# Patient Record
Sex: FEMALE | Race: ASIAN | NOT HISPANIC OR LATINO | Employment: PART TIME | URBAN - METROPOLITAN AREA
[De-identification: names, ages, dates, MRNs, and addresses within clinical notes are randomized per-mention and may not be internally consistent; named-entity substitution may affect disease eponyms.]

---

## 2024-11-06 ENCOUNTER — TRANSCRIBE ORDERS (OUTPATIENT)
Dept: ADMINISTRATIVE | Facility: HOSPITAL | Age: 44
End: 2024-11-06
Payer: OTHER GOVERNMENT

## 2024-11-06 DIAGNOSIS — R92.8 ABNORMALITY OF RIGHT BREAST ON SCREENING MAMMOGRAPHY: Primary | ICD-10-CM

## 2024-11-12 ENCOUNTER — OFFICE VISIT (OUTPATIENT)
Dept: FAMILY MEDICINE CLINIC | Facility: CLINIC | Age: 44
End: 2024-11-12
Payer: OTHER GOVERNMENT

## 2024-11-12 VITALS
RESPIRATION RATE: 18 BRPM | WEIGHT: 140 LBS | BODY MASS INDEX: 27.48 KG/M2 | DIASTOLIC BLOOD PRESSURE: 93 MMHG | HEIGHT: 60 IN | SYSTOLIC BLOOD PRESSURE: 165 MMHG | TEMPERATURE: 98.8 F | HEART RATE: 80 BPM | OXYGEN SATURATION: 100 %

## 2024-11-12 DIAGNOSIS — Z76.89 ENCOUNTER TO ESTABLISH CARE: ICD-10-CM

## 2024-11-12 DIAGNOSIS — R05.3 PERSISTENT COUGH FOR 3 WEEKS OR LONGER: ICD-10-CM

## 2024-11-12 DIAGNOSIS — R03.0 ELEVATED BLOOD PRESSURE READING IN OFFICE WITHOUT DIAGNOSIS OF HYPERTENSION: ICD-10-CM

## 2024-11-12 DIAGNOSIS — J45.41 MODERATE PERSISTENT ASTHMA WITH ACUTE EXACERBATION: Primary | ICD-10-CM

## 2024-11-12 DIAGNOSIS — R09.82 PND (POST-NASAL DRIP): ICD-10-CM

## 2024-11-12 PROCEDURE — 99204 OFFICE O/P NEW MOD 45 MIN: CPT | Performed by: REGISTERED NURSE

## 2024-11-12 RX ORDER — PREDNISONE 10 MG/1
TABLET ORAL SEE ADMIN INSTRUCTIONS
COMMUNITY
Start: 2024-11-06

## 2024-11-12 RX ORDER — MONTELUKAST SODIUM 4 MG/1
4 TABLET, CHEWABLE ORAL NIGHTLY
Qty: 90 TABLET | Refills: 0 | Status: SHIPPED | OUTPATIENT
Start: 2024-11-12

## 2024-11-12 RX ORDER — ALBUTEROL SULFATE 2.5 MG/3ML
SOLUTION RESPIRATORY (INHALATION)
COMMUNITY
Start: 2024-10-14

## 2024-11-12 RX ORDER — ALBUTEROL SULFATE 90 UG/1
POWDER, METERED RESPIRATORY (INHALATION)
COMMUNITY
Start: 2024-11-06

## 2024-11-12 RX ORDER — ALBUTEROL SULFATE 90 UG/1
INHALANT RESPIRATORY (INHALATION)
COMMUNITY

## 2024-11-12 NOTE — PROGRESS NOTES
Subjective        Stacy Eastman is a 43 y.o. female.     Chief Complaint   Patient presents with    Establish Care       History of Present Illness  The patient is a 43-year-old female who presents for evaluation of asthma.    She has been managing her asthma with an albuterol inhaler, using it as needed for wheezing. Recently, she has been using it more frequently, up to three times daily. Additionally, she uses a nebulizer once daily due to recent asthma attacks. Her asthma symptoms have been worsening over the past month, possibly due to a change in climate during a vacation. She is currently on the last day of a prednisone pack, which was prescribed for a cough that started on 10/10/2024. She has been using ProAir RespiClick once daily. She was diagnosed with asthma in 2014 but had not experienced an attack for a while until recently. She has not taken Singulair before. She had a pulmonary function test in 2014 or 2015 but has never had a TB skin test.    She has also been experiencing high blood pressure.    She reports chest pain when coughing and produces yellow-green phlegm. She occasionally has an itchy throat. She has been taking NyQuil at night.    She does not use any diabetic monitoring devices and reports no bladder or bowel issues. She is unsure if she has received the BCG vaccine. She reports no possibility of being pregnant.    SOCIAL HISTORY  She is from [facility].     The following portions of the patient's history were reviewed and updated as appropriate: allergies, current medications, past family history, past medical history, past social history, past surgical history and problem list.    Review of Systems     Current Outpatient Medications:     albuterol (PROVENTIL/VENTOLIN) nebulization syringe, , Disp: , Rfl:     albuterol sulfate  (90 Base) MCG/ACT inhaler, , Disp: , Rfl:     predniSONE (DELTASONE) 10 MG (21) dose pack, See Admin Instructions. follow package directions, Disp: ,  "Rfl:     ProAir RespiClick 108 (90 Base) MCG/ACT inhaler, INHALE 1 PUFF VIA INHALATION AS DIRECTED EVERY 4 HOURS, Disp: , Rfl:     Albuterol-Budesonide 90-80 MCG/ACT aerosol, Inhale 1 puff Every 4 (Four) Hours As Needed (wheeze or SOA)., Disp: 10.7 g, Rfl: 1    montelukast (Singulair) 4 MG chewable tablet, Chew 1 tablet Every Night., Disp: 90 tablet, Rfl: 0      Objective     /93 (BP Location: Left arm, Patient Position: Sitting, Cuff Size: Adult)   Pulse 80   Temp 98.8 °F (37.1 °C) (Oral)   Resp 18   Ht 152.4 cm (60\")   Wt 63.5 kg (140 lb)   SpO2 100%   BMI 27.34 kg/m²     Physical Exam  Vitals reviewed.   Constitutional:       Appearance: Normal appearance. She is normal weight.   HENT:      Right Ear: Ear canal and external ear normal. There is no impacted cerumen.      Left Ear: Ear canal and external ear normal. There is no impacted cerumen.      Ears:      Comments: Mild GALDINO bilaterally.     Nose: Nose normal.      Mouth/Throat:      Mouth: Mucous membranes are moist.      Pharynx: Oropharyngeal exudate and posterior oropharyngeal erythema present.      Comments: Mild redness and small amount of thick light green/white mucus at back of throat.  Eyes:      Conjunctiva/sclera: Conjunctivae normal.   Cardiovascular:      Rate and Rhythm: Normal rate and regular rhythm.      Pulses: Normal pulses.      Heart sounds: Normal heart sounds.   Pulmonary:      Effort: Pulmonary effort is normal.      Breath sounds: Normal breath sounds. No wheezing, rhonchi or rales.   Abdominal:      General: Bowel sounds are normal.      Palpations: Abdomen is soft.      Tenderness: There is no abdominal tenderness.   Musculoskeletal:      Right lower leg: No edema.      Left lower leg: No edema.   Skin:     General: Skin is warm and dry.      Capillary Refill: Capillary refill takes less than 2 seconds.   Neurological:      General: No focal deficit present.      Mental Status: She is alert and oriented to person, place, " and time.   Psychiatric:         Mood and Affect: Mood normal.         Behavior: Behavior normal.         Thought Content: Thought content normal.         Judgment: Judgment normal.         Physical Exam  Throat appears normal. Ears are clear. Sinuses are clear. Nose is clear. Minimal drainage noted down the throat.  No wheezing detected in lungs.    Vital Signs  Blood pressure in the left arm is 140/88.       Result Review :   The following data was reviewed by: ISAK Pinto on 11/12/2024:  No results found for this or any previous visit (from the past 8 weeks).             Results         Assessment & Plan    Diagnoses and all orders for this visit:    1. Moderate persistent asthma with acute exacerbation (Primary)  -     montelukast (Singulair) 4 MG chewable tablet; Chew 1 tablet Every Night.  Dispense: 90 tablet; Refill: 0  -     Complete PFT - Pre & Post Bronchodilator; Future  -     Cancel: TB Skin Test  -     XR Chest PA & Lateral; Future  -     Albuterol-Budesonide 90-80 MCG/ACT aerosol; Inhale 1 puff Every 4 (Four) Hours As Needed (wheeze or SOA).  Dispense: 10.7 g; Refill: 1    2. Elevated blood pressure reading in office without diagnosis of hypertension    3. Encounter to establish care    4. Persistent cough for 3 weeks or longer  -     Cancel: TB Skin Test  -     XR Chest PA & Lateral; Future  -     Albuterol-Budesonide 90-80 MCG/ACT aerosol; Inhale 1 puff Every 4 (Four) Hours As Needed (wheeze or SOA).  Dispense: 10.7 g; Refill: 1    5. PND (post-nasal drip)      I will order TB skin test after CXR is complete and reviewed.    There are no Patient Instructions on file for this visit.    Assessment & Plan  1. Asthma.  The patient reports increased asthma symptoms over the past month, likely exacerbated by a recent vacation and change in climate. She has been using her albuterol inhaler three times a day and her nebulizer once a day. A chest x-ray and TB skin test will be conducted. Pulmonary  function testing will be scheduled. A prescription for Singulair (montelukast) to be taken once daily at night has been provided. Mucinex liquid is recommended for cough and drainage. The use of Airsupra inhaler is advised, while the other two inhalers should be discontinued. She is currently finishing a prednisone pack for her cough.    2. Hypertension.  The patient reports experiencing high blood pressure, which may be influenced by her use of steroids and albuterol inhalers. Blood pressure reading today was 140/88 mmHg. Monitoring and management of blood pressure will continue.    3. Cough.  The patient has been experiencing a cough for about a week, with yellow-green phlegm. She is advised to take Mucinex liquid and avoid NyQuil. The cough may be related to drainage causing irritation.    4. Health Maintenance.  The patient inquired about receiving flu and COVID-19 vaccinations. She is advised to wait a week after finishing her steroid course to ensure proper immune response to the vaccinations.    Follow-up  Return in 1 month for follow-up.       Follow Up   Return in about 1 month (around 12/12/2024) for Recheck.    Patient was given instructions and counseling regarding her condition or for health maintenance advice. Please see specific information pulled into the AVS if appropriate.      ISAK Pinto     11/12/24    Patient or patient representative verbalized consent for the use of Ambient Listening during the visit with  ISAK Pinto for chart documentation. 11/12/2024  09:31 EST

## 2024-11-29 ENCOUNTER — HOSPITAL ENCOUNTER (OUTPATIENT)
Dept: RESPIRATORY THERAPY | Facility: HOSPITAL | Age: 44
Discharge: HOME | End: 2024-11-29
Payer: OTHER GOVERNMENT

## 2024-11-29 VITALS — OXYGEN SATURATION: 96 % | HEART RATE: 82 BPM | RESPIRATION RATE: 16 BRPM

## 2024-11-29 DIAGNOSIS — J45.41 MODERATE PERSISTENT ASTHMA WITH ACUTE EXACERBATION: ICD-10-CM

## 2024-11-29 PROCEDURE — 94726 PLETHYSMOGRAPHY LUNG VOLUMES: CPT

## 2024-11-29 PROCEDURE — 94060 EVALUATION OF WHEEZING: CPT

## 2024-11-29 PROCEDURE — 94729 DIFFUSING CAPACITY: CPT

## 2024-11-29 PROCEDURE — 94664 DEMO&/EVAL PT USE INHALER: CPT

## 2024-11-29 PROCEDURE — 94799 UNLISTED PULMONARY SVC/PX: CPT

## 2024-11-29 RX ORDER — ALBUTEROL SULFATE 90 UG/1
2 INHALANT RESPIRATORY (INHALATION)
Status: DISCONTINUED | OUTPATIENT
Start: 2024-11-29 | End: 2024-11-29

## 2024-11-29 RX ORDER — ALBUTEROL SULFATE 90 UG/1
2 INHALANT RESPIRATORY (INHALATION) ONCE
Status: COMPLETED | OUTPATIENT
Start: 2024-11-29 | End: 2024-11-29

## 2024-11-29 RX ADMIN — ALBUTEROL SULFATE 2 PUFF: 108 AEROSOL, METERED RESPIRATORY (INHALATION) at 07:11

## 2024-12-02 ENCOUNTER — TELEPHONE (OUTPATIENT)
Dept: FAMILY MEDICINE CLINIC | Facility: CLINIC | Age: 44
End: 2024-12-02
Payer: OTHER GOVERNMENT

## 2024-12-02 NOTE — TELEPHONE ENCOUNTER
Called patient to follow up on the XR Chest that Luma ordered. She said she will call the hospital to schedule her appointment.

## 2024-12-09 ENCOUNTER — OFFICE VISIT (OUTPATIENT)
Dept: FAMILY MEDICINE CLINIC | Facility: CLINIC | Age: 44
End: 2024-12-09
Payer: OTHER GOVERNMENT

## 2024-12-09 VITALS
HEIGHT: 60 IN | HEART RATE: 92 BPM | DIASTOLIC BLOOD PRESSURE: 70 MMHG | WEIGHT: 145 LBS | SYSTOLIC BLOOD PRESSURE: 130 MMHG | OXYGEN SATURATION: 99 % | TEMPERATURE: 98.2 F | RESPIRATION RATE: 15 BRPM | BODY MASS INDEX: 28.47 KG/M2

## 2024-12-09 DIAGNOSIS — Z23 ENCOUNTER FOR ADMINISTRATION OF COVID-19 VACCINE: ICD-10-CM

## 2024-12-09 DIAGNOSIS — Z23 INFLUENZA VACCINE NEEDED: ICD-10-CM

## 2024-12-09 DIAGNOSIS — J45.41 MODERATE PERSISTENT ASTHMA WITH ACUTE EXACERBATION: Primary | ICD-10-CM

## 2024-12-09 DIAGNOSIS — R03.0 ELEVATED BLOOD PRESSURE READING IN OFFICE WITHOUT DIAGNOSIS OF HYPERTENSION: ICD-10-CM

## 2024-12-09 DIAGNOSIS — Z13.1 SCREENING FOR DIABETES MELLITUS (DM): ICD-10-CM

## 2024-12-09 PROCEDURE — 91320 SARSCV2 VAC 30MCG TRS-SUC IM: CPT | Performed by: REGISTERED NURSE

## 2024-12-09 PROCEDURE — 90656 IIV3 VACC NO PRSV 0.5 ML IM: CPT | Performed by: REGISTERED NURSE

## 2024-12-09 PROCEDURE — 90480 ADMN SARSCOV2 VAC 1/ONLY CMP: CPT | Performed by: REGISTERED NURSE

## 2024-12-09 PROCEDURE — 90471 IMMUNIZATION ADMIN: CPT | Performed by: REGISTERED NURSE

## 2024-12-09 PROCEDURE — 99214 OFFICE O/P EST MOD 30 MIN: CPT | Performed by: REGISTERED NURSE

## 2024-12-09 NOTE — PROGRESS NOTES
"Subjective        Stacy Eastman is a 44 y.o. female who presents to [facility].     Chief Complaint   Patient presents with    Asthma     1 month f/u       Asthma  Her past medical history is significant for asthma.         F/U R/T asthma symptoms.    Asthma - Pt says her breathing is better. Uses AirSupra MDI twice a week.  She says she is also breathing better.  Elevated BP at last visit - B/P is normal today. Pt was finishing steroid tx before.    The following portions of the patient's history were reviewed and updated as appropriate: allergies, current medications, past family history, past medical history, past social history, past surgical history and problem list.    No Known Allergies       Current Outpatient Medications:     albuterol (PROVENTIL/VENTOLIN) nebulization syringe, , Disp: , Rfl:     Albuterol-Budesonide 90-80 MCG/ACT aerosol, Inhale 1 puff Every 4 (Four) Hours As Needed (wheeze or SOA)., Disp: 10.7 g, Rfl: 1    montelukast (Singulair) 4 MG chewable tablet, Chew 1 tablet Every Night., Disp: 90 tablet, Rfl: 0    Review of Systems     Objective     /70 (BP Location: Left arm, Patient Position: Sitting, Cuff Size: Adult)   Pulse 92   Temp 98.2 °F (36.8 °C) (Oral)   Resp 15   Ht 152.4 cm (60\")   Wt 65.8 kg (145 lb)   SpO2 99%   BMI 28.32 kg/m²   BMI is >= 25 and <30. (Overweight) The following options were offered after discussion;: information on healthy weight added to patient's after visit summary      Physical Exam  Vitals reviewed.   Constitutional:       Appearance: Normal appearance. She is normal weight.   HENT:      Right Ear: Tympanic membrane, ear canal and external ear normal. There is no impacted cerumen.      Left Ear: Tympanic membrane, ear canal and external ear normal. There is no impacted cerumen.      Nose: Nose normal. No congestion or rhinorrhea.      Mouth/Throat:      Mouth: Mucous membranes are moist.      Pharynx: Posterior " oropharyngeal erythema present. No oropharyngeal exudate.      Tonsils: No tonsillar exudate. 2+ on the right. 2+ on the left.      Comments: Mild erythema, chronically enlarged tonsils, mild cobblestoning.  Eyes:      Conjunctiva/sclera: Conjunctivae normal.   Cardiovascular:      Rate and Rhythm: Normal rate and regular rhythm.      Pulses: Normal pulses.      Heart sounds: Normal heart sounds.   Pulmonary:      Effort: Pulmonary effort is normal. No respiratory distress.      Breath sounds: Normal breath sounds. No stridor. No wheezing, rhonchi or rales.   Abdominal:      General: Abdomen is flat. Bowel sounds are normal.      Palpations: Abdomen is soft.      Tenderness: There is no abdominal tenderness.   Skin:     General: Skin is warm and dry.      Capillary Refill: Capillary refill takes less than 2 seconds.   Neurological:      General: No focal deficit present.      Mental Status: She is alert and oriented to person, place, and time.   Psychiatric:         Mood and Affect: Mood normal.         Behavior: Behavior normal.         Thought Content: Thought content normal.         Judgment: Judgment normal.                 Result Review    The following data was reviewed by: ISAK Pinto on 12/09/2024:               Assessment & Plan    Diagnoses and all orders for this visit:    1. Moderate persistent asthma with acute exacerbation (Primary)  -     Comprehensive Metabolic Panel; Future  -     CBC & Differential; Future    2. Elevated blood pressure reading in office without diagnosis of hypertension  -     TSH Rfx On Abnormal To Free T4; Future  -     Lipid Panel; Future  -     Comprehensive Metabolic Panel; Future  -     CBC & Differential; Future    3. Screening for diabetes mellitus (DM)  -     Hemoglobin A1c; Future    4. Influenza vaccine needed  -     Fluzone >6mos ([id number])    5. Encounter for administration of COVID-19 vaccine  -     COVID-19 (Pfizer) 12yrs+ (COMIRNATY)       There are no  Patient Instructions on file for this visit.      Follow Up   Return in about 6 months (around 6/9/2025) for Next scheduled follow up.    Patient was given instructions and counseling regarding her condition or for health maintenance advice. Please see specific information pulled into the AVS if appropriate.     Luma Hanson, APRN     12/09/24

## 2024-12-30 ENCOUNTER — TELEPHONE (OUTPATIENT)
Dept: FAMILY MEDICINE CLINIC | Facility: CLINIC | Age: 44
End: 2024-12-30
Payer: OTHER GOVERNMENT

## 2025-01-28 ENCOUNTER — LAB (OUTPATIENT)
Dept: LAB | Facility: HOSPITAL | Age: 45
End: 2025-01-28
Payer: OTHER GOVERNMENT

## 2025-01-28 DIAGNOSIS — J45.41 MODERATE PERSISTENT ASTHMA WITH ACUTE EXACERBATION: ICD-10-CM

## 2025-01-28 DIAGNOSIS — R03.0 ELEVATED BLOOD PRESSURE READING IN OFFICE WITHOUT DIAGNOSIS OF HYPERTENSION: ICD-10-CM

## 2025-01-28 DIAGNOSIS — D58.2 ABNORMAL HEMOGLOBIN (HGB): ICD-10-CM

## 2025-01-28 DIAGNOSIS — Z13.1 SCREENING FOR DIABETES MELLITUS (DM): ICD-10-CM

## 2025-01-28 LAB
ALBUMIN SERPL-MCNC: 4.1 G/DL (ref 3.5–5.2)
ALBUMIN/GLOB SERPL: 1.1 G/DL
ALP SERPL-CCNC: 82 U/L (ref 39–117)
ALT SERPL W P-5'-P-CCNC: 14 U/L (ref 1–33)
ANION GAP SERPL CALCULATED.3IONS-SCNC: 10.3 MMOL/L (ref 5–15)
AST SERPL-CCNC: 19 U/L (ref 1–32)
BASOPHILS # BLD AUTO: 0.06 10*3/MM3 (ref 0–0.2)
BASOPHILS NFR BLD AUTO: 0.9 % (ref 0–1.5)
BILIRUB SERPL-MCNC: 0.4 MG/DL (ref 0–1.2)
BUN SERPL-MCNC: 10 MG/DL (ref 6–20)
BUN/CREAT SERPL: 14.9 (ref 7–25)
CALCIUM SPEC-SCNC: 9 MG/DL (ref 8.6–10.5)
CHLORIDE SERPL-SCNC: 102 MMOL/L (ref 98–107)
CHOLEST SERPL-MCNC: 178 MG/DL (ref 0–200)
CO2 SERPL-SCNC: 23.7 MMOL/L (ref 22–29)
CREAT SERPL-MCNC: 0.67 MG/DL (ref 0.57–1)
DEPRECATED RDW RBC AUTO: 45.5 FL (ref 37–54)
EGFRCR SERPLBLD CKD-EPI 2021: 110.7 ML/MIN/1.73
EOSINOPHIL # BLD AUTO: 0.1 10*3/MM3 (ref 0–0.4)
EOSINOPHIL NFR BLD AUTO: 1.5 % (ref 0.3–6.2)
ERYTHROCYTE [DISTWIDTH] IN BLOOD BY AUTOMATED COUNT: 16 % (ref 12.3–15.4)
GLOBULIN UR ELPH-MCNC: 3.6 GM/DL
GLUCOSE SERPL-MCNC: 86 MG/DL (ref 65–99)
HBA1C MFR BLD: 5.8 % (ref 4.8–5.6)
HCT VFR BLD AUTO: 34.5 % (ref 34–46.6)
HDLC SERPL-MCNC: 60 MG/DL (ref 40–60)
HGB BLD-MCNC: 10.3 G/DL (ref 12–15.9)
IMM GRANULOCYTES # BLD AUTO: 0.03 10*3/MM3 (ref 0–0.05)
IMM GRANULOCYTES NFR BLD AUTO: 0.4 % (ref 0–0.5)
LDLC SERPL CALC-MCNC: 98 MG/DL (ref 0–100)
LDLC/HDLC SERPL: 1.59 {RATIO}
LYMPHOCYTES # BLD AUTO: 1.87 10*3/MM3 (ref 0.7–3.1)
LYMPHOCYTES NFR BLD AUTO: 27.2 % (ref 19.6–45.3)
MCH RBC QN AUTO: 23.3 PG (ref 26.6–33)
MCHC RBC AUTO-ENTMCNC: 29.9 G/DL (ref 31.5–35.7)
MCV RBC AUTO: 78.1 FL (ref 79–97)
MONOCYTES # BLD AUTO: 0.63 10*3/MM3 (ref 0.1–0.9)
MONOCYTES NFR BLD AUTO: 9.2 % (ref 5–12)
NEUTROPHILS NFR BLD AUTO: 4.18 10*3/MM3 (ref 1.7–7)
NEUTROPHILS NFR BLD AUTO: 60.8 % (ref 42.7–76)
NRBC BLD AUTO-RTO: 0 /100 WBC (ref 0–0.2)
PLATELET # BLD AUTO: 420 10*3/MM3 (ref 140–450)
PMV BLD AUTO: 10.6 FL (ref 6–12)
POTASSIUM SERPL-SCNC: 3.8 MMOL/L (ref 3.5–5.2)
PROT SERPL-MCNC: 7.7 G/DL (ref 6–8.5)
RBC # BLD AUTO: 4.42 10*6/MM3 (ref 3.77–5.28)
SODIUM SERPL-SCNC: 136 MMOL/L (ref 136–145)
TRIGL SERPL-MCNC: 112 MG/DL (ref 0–150)
TSH SERPL DL<=0.05 MIU/L-ACNC: 0.83 UIU/ML (ref 0.27–4.2)
VLDLC SERPL-MCNC: 20 MG/DL (ref 5–40)
WBC NRBC COR # BLD AUTO: 6.87 10*3/MM3 (ref 3.4–10.8)

## 2025-01-28 PROCEDURE — 36415 COLL VENOUS BLD VENIPUNCTURE: CPT

## 2025-01-28 PROCEDURE — 80061 LIPID PANEL: CPT

## 2025-01-28 PROCEDURE — 85025 COMPLETE CBC W/AUTO DIFF WBC: CPT

## 2025-01-28 PROCEDURE — 84443 ASSAY THYROID STIM HORMONE: CPT

## 2025-01-28 PROCEDURE — 82728 ASSAY OF FERRITIN: CPT

## 2025-01-28 PROCEDURE — 84466 ASSAY OF TRANSFERRIN: CPT

## 2025-01-28 PROCEDURE — 83615 LACTATE (LD) (LDH) ENZYME: CPT

## 2025-01-28 PROCEDURE — 83036 HEMOGLOBIN GLYCOSYLATED A1C: CPT

## 2025-01-28 PROCEDURE — 83540 ASSAY OF IRON: CPT

## 2025-01-28 PROCEDURE — 80053 COMPREHEN METABOLIC PANEL: CPT

## 2025-01-31 DIAGNOSIS — D58.2 ABNORMAL HEMOGLOBIN (HGB): Primary | ICD-10-CM

## 2025-01-31 LAB
FERRITIN SERPL-MCNC: 19.8 NG/ML (ref 13–150)
IRON 24H UR-MRATE: 61 MCG/DL (ref 37–145)
IRON SATN MFR SERPL: 11 % (ref 20–50)
LDH SERPL-CCNC: 187 U/L (ref 135–214)
TIBC SERPL-MCNC: 551 MCG/DL (ref 298–536)
TRANSFERRIN SERPL-MCNC: 370 MG/DL (ref 200–360)
TRANSFERRIN SERPL-MCNC: 370 MG/DL (ref 200–360)

## 2025-02-03 ENCOUNTER — TELEPHONE (OUTPATIENT)
Dept: FAMILY MEDICINE CLINIC | Facility: CLINIC | Age: 45
End: 2025-02-03
Payer: OTHER GOVERNMENT

## 2025-02-03 NOTE — TELEPHONE ENCOUNTER
Hub ok to relay:      Can you please call her and ask her the questions I sent on digitalbo[employer]? I need to know the answers before I make any further decisions on this. THANKS!             no

## 2025-02-03 NOTE — TELEPHONE ENCOUNTER
----- Message from Luma Hanson sent at 2/2/2025  9:21 PM EST -----  Can you please call her and ask her the questions I sent on [employer]? I need to know the answers before I make any further decisions on this. THANKS!  ----- Message -----  From: Lab, Background User  Sent: 1/28/2025  10:27 AM EST  To: ISAK Escobedo

## 2025-02-03 NOTE — TELEPHONE ENCOUNTER
Name: Stacy Eastman      Relationship: Self      Best Callback Number: [phone]       HUB PROVIDED THE RELAY MESSAGE FROM THE OFFICE      PATIENT: HAS FURTHER QUESTIONS AND WOULD LIKE A CALL BACK AT THE FOLLOWING PHONE NUMBERPATIENT STATED THAT SHE DOES HAVE PERIODS AND HEAVIEST DAY IS THE SECOND DAY.  PATIENT STATED THAT SHE HAS NEVER HAS A COLONOSCOPY.    ADDITIONAL INFORMATION:

## 2025-02-15 DIAGNOSIS — J45.41 MODERATE PERSISTENT ASTHMA WITH ACUTE EXACERBATION: ICD-10-CM

## 2025-02-17 RX ORDER — MONTELUKAST SODIUM 4 MG/1
4 TABLET, CHEWABLE ORAL NIGHTLY
Qty: 90 TABLET | Refills: 0 | Status: SHIPPED | OUTPATIENT
Start: 2025-02-17

## 2025-02-21 ENCOUNTER — LAB (OUTPATIENT)
Dept: LAB | Facility: HOSPITAL | Age: 45
End: 2025-02-21
Payer: OTHER GOVERNMENT

## 2025-02-21 DIAGNOSIS — D58.2 ABNORMAL HEMOGLOBIN (HGB): ICD-10-CM

## 2025-02-21 LAB
RETICS # AUTO: 0.06 10*6/MM3 (ref 0.02–0.13)
RETICS/RBC NFR AUTO: 1.51 % (ref 0.7–1.9)

## 2025-02-21 PROCEDURE — 85045 AUTOMATED RETICULOCYTE COUNT: CPT

## 2025-02-21 PROCEDURE — 36415 COLL VENOUS BLD VENIPUNCTURE: CPT

## 2025-04-24 ENCOUNTER — OFFICE VISIT (OUTPATIENT)
Dept: FAMILY MEDICINE CLINIC | Facility: CLINIC | Age: 45
End: 2025-04-24
Payer: OTHER GOVERNMENT

## 2025-04-24 VITALS
RESPIRATION RATE: 18 BRPM | HEIGHT: 60 IN | WEIGHT: 145 LBS | BODY MASS INDEX: 28.47 KG/M2 | OXYGEN SATURATION: 99 % | SYSTOLIC BLOOD PRESSURE: 130 MMHG | DIASTOLIC BLOOD PRESSURE: 82 MMHG | TEMPERATURE: 98 F | HEART RATE: 84 BPM

## 2025-04-24 DIAGNOSIS — R03.0 ELEVATED BLOOD PRESSURE READING IN OFFICE WITHOUT DIAGNOSIS OF HYPERTENSION: Primary | ICD-10-CM

## 2025-04-24 DIAGNOSIS — D50.8 IRON DEFICIENCY ANEMIA SECONDARY TO INADEQUATE DIETARY IRON INTAKE: ICD-10-CM

## 2025-04-24 DIAGNOSIS — J45.40 MODERATE PERSISTENT ASTHMA WITHOUT COMPLICATION: ICD-10-CM

## 2025-04-24 PROBLEM — D50.9 IDA (IRON DEFICIENCY ANEMIA): Status: ACTIVE | Noted: 2025-04-24

## 2025-04-24 PROCEDURE — 99214 OFFICE O/P EST MOD 30 MIN: CPT | Performed by: REGISTERED NURSE

## 2025-04-24 RX ORDER — FERROUS GLUCONATE 324(38)MG
324 TABLET ORAL
Qty: 90 TABLET | Refills: 1 | Status: SHIPPED | OUTPATIENT
Start: 2025-04-24

## 2025-04-24 NOTE — PATIENT INSTRUCTIONS
Have CBC done at [facility] out-patient lab about 1 week before your next appointment.    Cut down on salt intake.    Cut down on caffeine to decrease urgency/overactive bladder. Call if you do not notice an improvement with this.    Call office for lab results if you have not received a call from our office or [employer] message in 1-2 days.      Call your pharmacy for refills within 7 days of needing medication.     I highly recommend watching your dietary intake of:  fats (avoid saturated and trans fats), fried foods, simple sugars/carbs (sugary drinks, alcohol, carbs without fiber, refined sugar, starchy foods). Try to increase your intake of: good oils (olive, avocado), unsaturated fats (mono and poly), chicken and fish, broiled or roasted foods, fruits and vegetables, fiber.     Please call the office if you have any questions or concerns.    Thank you,  NATALIIA Perez    Office number:   [phone]

## 2025-04-24 NOTE — PROGRESS NOTES
"Subjective        Stacy Eastman is a 44 y.o. female who presents to [facility].     Chief Complaint   Patient presents with    Asthma     4 month fl/u       History of Present Illness    F/U R/T elevated B/P.    Elevated BP - Denies CP/SOA/HA/dizziness/edema. She says she has a high salt intake with her diet.  Asthma - chronic/improved. She says Spring is the worst season for her asthma, but she hasn't had to use her AirSupra MDI since last week. Usually uses 1 puff 2x weekly. Not using nebulizer. She says Singulair helps on its own.  Frequency - Says she starts with frequent urination after her first BIG cup of coffee in the morning, feels she goes to the bathroom every 30 minutes. Denies dysuria, blood in urine, suprapubic or abdominal pain, back pain.  Overactive bladder - See #3.  CARLOS - Pt says she has heavy periods starting on the 2nd day. Usually decreased at start of the 4th day and ends after 5 days.    The following portions of the patient's history were reviewed and updated as appropriate: allergies, current medications, past family history, past medical history, past social history, past surgical history and problem list.      No Known Allergies       Current Outpatient Medications:     albuterol (PROVENTIL/VENTOLIN) nebulization syringe, , Disp: , Rfl:     Albuterol-Budesonide 90-80 MCG/ACT aerosol, Inhale 1 puff Every 4 (Four) Hours As Needed (wheeze or SOA)., Disp: 10.7 g, Rfl: 1    montelukast (SINGULAIR) 4 MG chewable tablet, CHEW AND SWALLOW 1 TABLET BY MOUTH EVERY NIGHT, Disp: 90 tablet, Rfl: 0    ferrous gluconate (FERGON) 324 MG tablet, Take 1 tablet by mouth Daily With Breakfast., Disp: 90 tablet, Rfl: 1         Objective     /82 (BP Location: Left arm, Patient Position: Sitting, Cuff Size: Adult)   Pulse 84   Temp 98 °F (36.7 °C) (Oral)   Resp 18   Ht 152.4 cm (60\")   Wt 65.8 kg (145 lb)   SpO2 99%   BMI 28.32 kg/m²         Physical Exam  Vitals " reviewed.   Constitutional:       General: She is not in acute distress.     Appearance: Normal appearance. She is normal weight. She is not ill-appearing.   Eyes:      Conjunctiva/sclera: Conjunctivae normal.      Pupils: Pupils are equal, round, and reactive to light.   Cardiovascular:      Rate and Rhythm: Normal rate and regular rhythm.      Pulses: Normal pulses.      Heart sounds: Normal heart sounds.   Pulmonary:      Effort: Pulmonary effort is normal.      Breath sounds: Normal breath sounds.   Abdominal:      General: Bowel sounds are normal.      Palpations: Abdomen is soft.      Tenderness: There is no abdominal tenderness.      Comments: BM daily   Musculoskeletal:      Right lower leg: No edema.      Left lower leg: No edema.   Skin:     General: Skin is warm and dry.      Capillary Refill: Capillary refill takes less than 2 seconds.   Neurological:      General: No focal deficit present.      Mental Status: She is alert and oriented to person, place, and time.   Psychiatric:         Mood and Affect: Mood normal.         Behavior: Behavior normal.         Thought Content: Thought content normal.         Judgment: Judgment normal.         PHQ-2 Depression Screening  Little interest or pleasure in doing things?  0   Feeling down, depressed, or hopeless?  0   PHQ-2 Total Score  0         Result Review    The following data was reviewed by: ISAK Pinto on 04/24/2025:  Common labs          1/28/2025    08:54   Common Labs   Glucose 86    BUN 10    Creatinine 0.67    Sodium 136    Potassium 3.8    Chloride 102    Calcium 9.0    Albumin 4.1    Total Bilirubin 0.4    Alkaline Phosphatase 82    AST (SGOT) 19    ALT (SGPT) 14    WBC 6.87    Hemoglobin 10.3    Hematocrit 34.5    Platelets 420    Total Cholesterol 178    Triglycerides 112    HDL Cholesterol 60    LDL Cholesterol  98    Hemoglobin A1C 5.80                 Assessment & Plan    Diagnoses and all orders for this visit:    1. Elevated blood  pressure reading in office without diagnosis of hypertension (Primary)    2. Iron deficiency anemia secondary to inadequate dietary iron intake  -     CBC & Differential; Future  -     ferrous gluconate (FERGON) 324 MG tablet; Take 1 tablet by mouth Daily With Breakfast.  Dispense: 90 tablet; Refill: 1    3. Moderate persistent asthma without complication       Patient Instructions   Have CBC done at [facility] out-patient lab about 1 week before your next appointment.    Cut down on salt intake.    Cut down on caffeine to decrease urgency/overactive bladder. Call if you do not notice an improvement with this.    Call office for lab results if you have not received a call from our office or [employer] message in 1-2 days.      Call your pharmacy for refills within 7 days of needing medication.     I highly recommend watching your dietary intake of:  fats (avoid saturated and trans fats), fried foods, simple sugars/carbs (sugary drinks, alcohol, carbs without fiber, refined sugar, starchy foods). Try to increase your intake of: good oils (olive, avocado), unsaturated fats (mono and poly), chicken and fish, broiled or roasted foods, fruits and vegetables, fiber.     Please call the office if you have any questions or concerns.    Thank you,  NATALIIA Perez    Office number:   [phone]       Follow Up   Return in about 3 months (around 7/24/2025) for Recheck.    Patient was given instructions and counseling regarding her condition or for health maintenance advice. Please see specific information pulled into the AVS if appropriate.     ISAK Pinto     04/24/25

## 2025-05-12 DIAGNOSIS — J45.41 MODERATE PERSISTENT ASTHMA WITH ACUTE EXACERBATION: ICD-10-CM

## 2025-05-12 RX ORDER — MONTELUKAST SODIUM 4 MG/1
4 TABLET, CHEWABLE ORAL NIGHTLY
Qty: 90 TABLET | Refills: 0 | Status: SHIPPED | OUTPATIENT
Start: 2025-05-12

## 2025-07-14 ENCOUNTER — LAB (OUTPATIENT)
Dept: LAB | Facility: HOSPITAL | Age: 45
End: 2025-07-14
Payer: OTHER GOVERNMENT

## 2025-07-14 DIAGNOSIS — D50.8 IRON DEFICIENCY ANEMIA SECONDARY TO INADEQUATE DIETARY IRON INTAKE: ICD-10-CM

## 2025-07-14 LAB
BASOPHILS # BLD AUTO: 0.06 10*3/MM3 (ref 0–0.2)
BASOPHILS NFR BLD AUTO: 0.8 % (ref 0–1.5)
DEPRECATED RDW RBC AUTO: 57 FL (ref 37–54)
EOSINOPHIL # BLD AUTO: 0.18 10*3/MM3 (ref 0–0.4)
EOSINOPHIL NFR BLD AUTO: 2.4 % (ref 0.3–6.2)
ERYTHROCYTE [DISTWIDTH] IN BLOOD BY AUTOMATED COUNT: 17.2 % (ref 12.3–15.4)
HCT VFR BLD AUTO: 43.3 % (ref 34–46.6)
HGB BLD-MCNC: 13.9 G/DL (ref 12–15.9)
IMM GRANULOCYTES # BLD AUTO: 0.03 10*3/MM3 (ref 0–0.05)
IMM GRANULOCYTES NFR BLD AUTO: 0.4 % (ref 0–0.5)
LYMPHOCYTES # BLD AUTO: 2.14 10*3/MM3 (ref 0.7–3.1)
LYMPHOCYTES NFR BLD AUTO: 28.4 % (ref 19.6–45.3)
MCH RBC QN AUTO: 29 PG (ref 26.6–33)
MCHC RBC AUTO-ENTMCNC: 32.1 G/DL (ref 31.5–35.7)
MCV RBC AUTO: 90.2 FL (ref 79–97)
MONOCYTES # BLD AUTO: 0.62 10*3/MM3 (ref 0.1–0.9)
MONOCYTES NFR BLD AUTO: 8.2 % (ref 5–12)
NEUTROPHILS NFR BLD AUTO: 4.51 10*3/MM3 (ref 1.7–7)
NEUTROPHILS NFR BLD AUTO: 59.8 % (ref 42.7–76)
NRBC BLD AUTO-RTO: 0 /100 WBC (ref 0–0.2)
PLATELET # BLD AUTO: 391 10*3/MM3 (ref 140–450)
PMV BLD AUTO: 10.6 FL (ref 6–12)
RBC # BLD AUTO: 4.8 10*6/MM3 (ref 3.77–5.28)
WBC NRBC COR # BLD AUTO: 7.54 10*3/MM3 (ref 3.4–10.8)

## 2025-07-14 PROCEDURE — 36415 COLL VENOUS BLD VENIPUNCTURE: CPT

## 2025-07-14 PROCEDURE — 85025 COMPLETE CBC W/AUTO DIFF WBC: CPT

## 2025-07-21 ENCOUNTER — OFFICE VISIT (OUTPATIENT)
Dept: FAMILY MEDICINE CLINIC | Facility: CLINIC | Age: 45
End: 2025-07-21
Payer: OTHER GOVERNMENT

## 2025-07-21 VITALS
WEIGHT: 146 LBS | RESPIRATION RATE: 17 BRPM | BODY MASS INDEX: 28.66 KG/M2 | OXYGEN SATURATION: 97 % | SYSTOLIC BLOOD PRESSURE: 132 MMHG | HEIGHT: 60 IN | HEART RATE: 92 BPM | DIASTOLIC BLOOD PRESSURE: 91 MMHG | TEMPERATURE: 97.5 F

## 2025-07-21 DIAGNOSIS — D50.8 IRON DEFICIENCY ANEMIA SECONDARY TO INADEQUATE DIETARY IRON INTAKE: ICD-10-CM

## 2025-07-21 DIAGNOSIS — J45.20 MILD INTERMITTENT ASTHMA WITHOUT COMPLICATION: Primary | ICD-10-CM

## 2025-07-21 DIAGNOSIS — J45.41 MODERATE PERSISTENT ASTHMA WITH ACUTE EXACERBATION: ICD-10-CM

## 2025-07-21 DIAGNOSIS — I10 PRIMARY HYPERTENSION: ICD-10-CM

## 2025-07-21 DIAGNOSIS — E66.3 OVERWEIGHT: ICD-10-CM

## 2025-07-21 PROBLEM — J45.901 MODERATE ASTHMA WITH EXACERBATION: Status: ACTIVE | Noted: 2025-07-21

## 2025-07-21 PROCEDURE — 99214 OFFICE O/P EST MOD 30 MIN: CPT | Performed by: REGISTERED NURSE

## 2025-07-21 RX ORDER — MONTELUKAST SODIUM 4 MG/1
4 TABLET, CHEWABLE ORAL NIGHTLY
Qty: 90 TABLET | Refills: 0 | Status: SHIPPED | OUTPATIENT
Start: 2025-07-21

## 2025-07-21 RX ORDER — ALBUTEROL SULFATE 90 UG/1
INHALANT RESPIRATORY (INHALATION) EVERY 6 HOURS SCHEDULED
COMMUNITY

## 2025-07-21 NOTE — PROGRESS NOTES
Subjective        Stacy Eastman is a 44 y.o. female who presents to [facility].     Chief Complaint   Patient presents with    Hypertension     3 month fl/u       Hypertension  Associated symptoms: no chest pain, no headaches and no neck pain      Pertinent negative symptoms include no abdominal pain, no anorexia, no joint pain, no change in stool, no chest pain, no chills, no congestion, no cough, no diaphoresis, no fatigue, no fever, no headaches, no joint swelling, no myalgias, no nausea, no neck pain, no numbness, no rash, no sore throat, no swollen glands, no dysuria, no vertigo, no visual change, no vomiting and no weakness.     Pt is here for F/U R/T chronic condition(s) and medication(s), lab test(s) and/or refill(s).    Asthma - Recent dx at [facility]. She does not use the nebulizer and uses her MDI infrequently she says.  CARLOS - Improved. Hgb currently 13.9, up from 10.3. Fe studies from 5 months ago abnormal, will re-check. Continue Fe supplementation for now.  Overweight - stable. DASH diet recommended.  HTN - BP high in April and slightly elevated today. Pt would like to try diet and exercise for 3 months to see if she can lower her weight and control her BP.       The following portions of the patient's history were reviewed and updated as appropriate: allergies, current medications, past family history, past medical history, past social history, past surgical history and problem list.    No Known Allergies       Current Outpatient Medications:     albuterol (PROVENTIL/VENTOLIN) nebulization syringe, , Disp: , Rfl:     Albuterol-Budesonide 90-80 MCG/ACT aerosol, Inhale 1 puff Every 4 (Four) Hours As Needed (wheeze or SOA)., Disp: 10.7 g, Rfl: 1    ferrous gluconate (FERGON) 324 MG tablet, Take 1 tablet by mouth Daily With Breakfast., Disp: 90 tablet, Rfl: 1    montelukast (SINGULAIR) 4 MG chewable tablet, CHEW AND SWALLOW 1 TABLET BY MOUTH EVERY NIGHT, Disp: 90  "tablet, Rfl: 0    Review of Systems   Constitutional:  Negative for chills, diaphoresis, fatigue and fever.   HENT:  Negative for congestion and sore throat.    Respiratory:  Negative for cough.    Cardiovascular:  Negative for chest pain.   Gastrointestinal:  Negative for abdominal pain, anorexia, nausea and vomiting.   Genitourinary:  Negative for dysuria.   Musculoskeletal:  Negative for joint pain, myalgias and neck pain.   Skin:  Negative for rash.   Neurological:  Negative for vertigo, weakness, numbness and headaches.        Objective     /91 (BP Location: Left arm, Patient Position: Sitting, Cuff Size: Adult)   Pulse 92   Temp 97.5 °F (36.4 °C) (Oral)   Resp 17   Ht 152.4 cm (60\")   Wt 66.2 kg (146 lb)   SpO2 97%   BMI 28.51 kg/m²         Physical Exam  Vitals and nursing note reviewed.   Constitutional:       General: She is not in acute distress.     Appearance: Normal appearance. She is well-developed and normal weight. She is not ill-appearing.   HENT:      Head: Normocephalic and atraumatic.   Eyes:      Conjunctiva/sclera: Conjunctivae normal.      Pupils: Pupils are equal, round, and reactive to light.   Cardiovascular:      Rate and Rhythm: Normal rate and regular rhythm.      Pulses: Normal pulses.      Heart sounds: Normal heart sounds.   Pulmonary:      Effort: Pulmonary effort is normal.      Breath sounds: Normal breath sounds.   Abdominal:      General: Bowel sounds are normal.      Palpations: Abdomen is soft.      Tenderness: There is no abdominal tenderness.   Musculoskeletal:         General: Normal range of motion.      Right lower leg: No edema.      Left lower leg: No edema.   Skin:     General: Skin is warm and dry.      Capillary Refill: Capillary refill takes less than 2 seconds.   Neurological:      General: No focal deficit present.      Mental Status: She is alert and oriented to person, place, and time.      Sensory: No sensory deficit.      Motor: No weakness.      " Gait: Gait normal.   Psychiatric:         Mood and Affect: Mood normal.         Behavior: Behavior normal.         Thought Content: Thought content normal.         Judgment: Judgment normal.         PHQ-2 Depression Screening  Little interest or pleasure in doing things? Not at all   Feeling down, depressed, or hopeless? Not at all   PHQ-2 Total Score 0         Result Review    The following data was reviewed by: ISAK Pinto on 07/21/2025:  Common labs          1/28/2025    08:54 7/14/2025    08:57   Common Labs   Glucose 86     BUN 10     Creatinine 0.67     Sodium 136     Potassium 3.8     Chloride 102     Calcium 9.0     Albumin 4.1     Total Bilirubin 0.4     Alkaline Phosphatase 82     AST (SGOT) 19     ALT (SGPT) 14     WBC 6.87  7.54    Hemoglobin 10.3  13.9    Hematocrit 34.5  43.3    Platelets 420  391    Total Cholesterol 178     Triglycerides 112     HDL Cholesterol 60     LDL Cholesterol  98     Hemoglobin A1C 5.80                  Assessment & Plan    Diagnoses and all orders for this visit:    1. Mild intermittent asthma without complication (Primary)    2. Iron deficiency anemia secondary to inadequate dietary iron intake  -     Iron Profile w/o Ferritin; Future  -     Ferritin; Future    3. Overweight    4. Primary hypertension       Patient Instructions   Follow the DASH diet plan for weight loss and blood pressure control. We will re-check in 3 months.    Call office for lab results if you have not received a call from our office or [employer] message in 1-2 days.      Call your pharmacy for refills within 7 days of needing medication.     Please call the office if you have any questions or concerns.    Thank you,  NATALIIA Perez    Office number:   [phone]       Follow Up   Return in about 3 months (around 10/21/2025) for Recheck.    Patient was given instructions and counseling regarding her condition or for health maintenance advice. Please see specific information pulled  into the AVS if appropriate.     Luma Hanson, APRN     07/21/25

## 2025-07-21 NOTE — PATIENT INSTRUCTIONS
Follow the DASH diet plan for weight loss and blood pressure control. We will re-check in 3 months.    Call office for lab results if you have not received a call from our office or [employer] message in 1-2 days.      Call your pharmacy for refills within 7 days of needing medication.     Please call the office if you have any questions or concerns.    Thank you,  NATALIIA Perez    Office number:   [phone]

## 2025-07-22 ENCOUNTER — LAB (OUTPATIENT)
Dept: LAB | Facility: HOSPITAL | Age: 45
End: 2025-07-22
Payer: OTHER GOVERNMENT

## 2025-07-22 DIAGNOSIS — D50.8 IRON DEFICIENCY ANEMIA SECONDARY TO INADEQUATE DIETARY IRON INTAKE: ICD-10-CM

## 2025-07-22 LAB
FERRITIN SERPL-MCNC: 50.6 NG/ML (ref 13–150)
IRON 24H UR-MRATE: 76 MCG/DL (ref 37–145)
IRON SATN MFR SERPL: 17 % (ref 20–50)
TIBC SERPL-MCNC: 454 MCG/DL (ref 298–536)
TRANSFERRIN SERPL-MCNC: 305 MG/DL (ref 200–360)

## 2025-07-22 PROCEDURE — 84466 ASSAY OF TRANSFERRIN: CPT

## 2025-07-22 PROCEDURE — 36415 COLL VENOUS BLD VENIPUNCTURE: CPT

## 2025-07-22 PROCEDURE — 82728 ASSAY OF FERRITIN: CPT

## 2025-07-22 PROCEDURE — 83540 ASSAY OF IRON: CPT
